# Patient Record
Sex: MALE | Race: WHITE | RURAL
[De-identification: names, ages, dates, MRNs, and addresses within clinical notes are randomized per-mention and may not be internally consistent; named-entity substitution may affect disease eponyms.]

---

## 2017-02-27 ENCOUNTER — HOSPITAL ENCOUNTER (OUTPATIENT)
Dept: OTHER | Age: 47
Discharge: HOME | End: 2017-02-27
Attending: NURSE PRACTITIONER | Admitting: NURSE PRACTITIONER

## 2017-02-28 ENCOUNTER — HOSPITAL ENCOUNTER (OUTPATIENT)
Dept: OTHER | Age: 47
Discharge: HOME | End: 2017-02-28
Attending: NURSE PRACTITIONER | Admitting: NURSE PRACTITIONER

## 2017-02-28 DIAGNOSIS — M25.9 DISORDER OF JOINT: ICD-10-CM

## 2017-02-28 DIAGNOSIS — M25.512 LEFT SHOULDER PAIN, UNSPECIFIED CHRONICITY: ICD-10-CM

## 2017-02-28 DIAGNOSIS — M25.511 RIGHT SHOULDER PAIN, UNSPECIFIED CHRONICITY: ICD-10-CM

## 2017-02-28 DIAGNOSIS — M54.42 ACUTE BACK PAIN WITH SCIATICA, LEFT: ICD-10-CM

## 2017-02-28 LAB
A/G RATIO: 2.3 (ref 0.8–2)
ALBUMIN SERPL-MCNC: 4.8 G/DL (ref 3.4–4.8)
ALP BLD-CCNC: 78 U/L (ref 25–100)
ALT SERPL-CCNC: 28 U/L (ref 4–36)
ANION GAP SERPL CALCULATED.3IONS-SCNC: 14 MMOL/L (ref 3–16)
AST SERPL-CCNC: 27 U/L (ref 8–33)
BASOPHILS ABSOLUTE: 0 K/UL (ref 0–0.1)
BASOPHILS RELATIVE PERCENT: 0.3 %
BILIRUB SERPL-MCNC: 0.7 MG/DL (ref 0.3–1.2)
BUN BLDV-MCNC: 9 MG/DL (ref 6–20)
CALCIUM SERPL-MCNC: 9.4 MG/DL (ref 8.5–10.5)
CHLORIDE BLD-SCNC: 103 MMOL/L (ref 98–107)
CHOLESTEROL, TOTAL: 192 MG/DL (ref 0–200)
CO2: 23 MMOL/L (ref 20–30)
CREAT SERPL-MCNC: 1 MG/DL (ref 0.4–1.2)
EOSINOPHILS ABSOLUTE: 0.1 K/UL (ref 0–0.4)
EOSINOPHILS RELATIVE PERCENT: 1 %
FOLATE: 10.54 NG/ML
GFR AFRICAN AMERICAN: >59
GFR NON-AFRICAN AMERICAN: >59
GLOBULIN: 2.1 G/DL
GLUCOSE BLD-MCNC: 98 MG/DL (ref 74–106)
HCT VFR BLD CALC: 46.7 % (ref 40–54)
HDLC SERPL-MCNC: 56 MG/DL (ref 40–60)
HEMOGLOBIN: 16 G/DL (ref 13–18)
LDL CHOLESTEROL CALCULATED: 117 MG/DL
LYMPHOCYTES ABSOLUTE: 1.8 K/UL (ref 1.5–4)
LYMPHOCYTES RELATIVE PERCENT: 19.6 % (ref 20–40)
MCH RBC QN AUTO: 29.4 PG (ref 27–32)
MCHC RBC AUTO-ENTMCNC: 34.3 G/DL (ref 31–35)
MCV RBC AUTO: 85.7 FL (ref 80–100)
MONOCYTES ABSOLUTE: 0.5 K/UL (ref 0.2–0.8)
MONOCYTES RELATIVE PERCENT: 5.3 % (ref 3–10)
NEUTROPHILS ABSOLUTE: 6.8 K/UL (ref 2–7.5)
NEUTROPHILS RELATIVE PERCENT: 73.8 %
PDW BLD-RTO: 14.1 % (ref 11–16)
PLATELET # BLD: 306 K/UL (ref 150–400)
PMV BLD AUTO: 10.3 FL (ref 6–10)
POTASSIUM SERPL-SCNC: 4.3 MMOL/L (ref 3.4–5.1)
RBC # BLD: 5.45 M/UL (ref 4.5–6)
RHEUMATOID FACTOR: <10 IU/ML
SEDIMENTATION RATE, ERYTHROCYTE: 1 MM/HR (ref 0–10)
SODIUM BLD-SCNC: 140 MMOL/L (ref 136–145)
TOTAL PROTEIN: 6.9 G/DL (ref 6.4–8.3)
TRIGL SERPL-MCNC: 95 MG/DL (ref 0–249)
URIC ACID, SERUM: 6.2 MG/DL (ref 3.7–8.6)
VITAMIN B-12: 368 PG/ML (ref 211–911)
VLDLC SERPL CALC-MCNC: 19 MG/DL
WBC # BLD: 9.1 K/UL (ref 4–11)

## 2017-03-01 LAB
ANA INTERPRETATION: NORMAL
ANTI-NUCLEAR ANTIBODY (ANA): NEGATIVE

## 2017-03-07 ENCOUNTER — TELEPHONE (OUTPATIENT)
Dept: OTHER | Age: 47
End: 2017-03-07

## 2017-05-12 ENCOUNTER — HOSPITAL ENCOUNTER (OUTPATIENT)
Dept: MRI IMAGING | Age: 47
Discharge: HOME | End: 2017-05-12
Attending: NURSE PRACTITIONER | Admitting: NURSE PRACTITIONER

## 2017-05-12 DIAGNOSIS — M54.2 CERVICALGIA: ICD-10-CM

## 2017-05-12 DIAGNOSIS — M54.5 LOW BACK PAIN, UNSPECIFIED BACK PAIN LATERALITY, UNSPECIFIED CHRONICITY, WITH SCIATICA PRESENCE UNSPECIFIED: ICD-10-CM

## 2017-05-12 DIAGNOSIS — M54.40 LUMBAGO WITH SCIATICA: ICD-10-CM

## 2023-08-11 ENCOUNTER — APPOINTMENT (OUTPATIENT)
Dept: ULTRASOUND IMAGING | Facility: HOSPITAL | Age: 53
End: 2023-08-11
Payer: MEDICAID

## 2023-08-11 ENCOUNTER — APPOINTMENT (OUTPATIENT)
Dept: CT IMAGING | Facility: HOSPITAL | Age: 53
End: 2023-08-11
Payer: MEDICAID

## 2023-08-11 ENCOUNTER — HOSPITAL ENCOUNTER (EMERGENCY)
Facility: HOSPITAL | Age: 53
Discharge: HOME | End: 2023-08-11
Attending: STUDENT IN AN ORGANIZED HEALTH CARE EDUCATION/TRAINING PROGRAM
Payer: MEDICAID

## 2023-08-11 ENCOUNTER — APPOINTMENT (OUTPATIENT)
Dept: GENERAL RADIOLOGY | Facility: HOSPITAL | Age: 53
End: 2023-08-11
Payer: MEDICAID

## 2023-08-11 VITALS
OXYGEN SATURATION: 99 % | WEIGHT: 165 LBS | TEMPERATURE: 98.5 F | HEIGHT: 66 IN | RESPIRATION RATE: 16 BRPM | SYSTOLIC BLOOD PRESSURE: 136 MMHG | HEART RATE: 85 BPM | DIASTOLIC BLOOD PRESSURE: 92 MMHG | BODY MASS INDEX: 26.52 KG/M2

## 2023-08-11 DIAGNOSIS — M79.671 FOOT PAIN, RIGHT: Primary | ICD-10-CM

## 2023-08-11 LAB
ALBUMIN SERPL-MCNC: 3.1 G/DL (ref 3.5–5.2)
ALBUMIN/GLOB SERPL: 0.9 G/DL
ALP SERPL-CCNC: 415 U/L (ref 39–117)
ALT SERPL W P-5'-P-CCNC: 78 U/L (ref 1–41)
ANION GAP SERPL CALCULATED.3IONS-SCNC: 12.1 MMOL/L (ref 5–15)
AST SERPL-CCNC: 43 U/L (ref 1–40)
BASOPHILS # BLD AUTO: 0.05 10*3/MM3 (ref 0–0.2)
BASOPHILS NFR BLD AUTO: 0.4 % (ref 0–1.5)
BILIRUB SERPL-MCNC: 1.1 MG/DL (ref 0–1.2)
BUN SERPL-MCNC: 19 MG/DL (ref 6–20)
BUN/CREAT SERPL: 28.4 (ref 7–25)
CALCIUM SPEC-SCNC: 8.9 MG/DL (ref 8.6–10.5)
CHLORIDE SERPL-SCNC: 94 MMOL/L (ref 98–107)
CO2 SERPL-SCNC: 23.9 MMOL/L (ref 22–29)
CREAT SERPL-MCNC: 0.67 MG/DL (ref 0.76–1.27)
D DIMER PPP FEU-MCNC: 5.81 MCGFEU/ML (ref 0–0.51)
DEPRECATED RDW RBC AUTO: 38.2 FL (ref 37–54)
EGFRCR SERPLBLD CKD-EPI 2021: 113 ML/MIN/1.73
EOSINOPHIL # BLD AUTO: 0.04 10*3/MM3 (ref 0–0.4)
EOSINOPHIL NFR BLD AUTO: 0.3 % (ref 0.3–6.2)
ERYTHROCYTE [DISTWIDTH] IN BLOOD BY AUTOMATED COUNT: 13.3 % (ref 12.3–15.4)
GLOBULIN UR ELPH-MCNC: 3.6 GM/DL
GLUCOSE SERPL-MCNC: 157 MG/DL (ref 65–99)
HCT VFR BLD AUTO: 39.5 % (ref 37.5–51)
HGB BLD-MCNC: 13.5 G/DL (ref 13–17.7)
HOLD SPECIMEN: NORMAL
HOLD SPECIMEN: NORMAL
IMM GRANULOCYTES # BLD AUTO: 0.07 10*3/MM3 (ref 0–0.05)
IMM GRANULOCYTES NFR BLD AUTO: 0.6 % (ref 0–0.5)
LYMPHOCYTES # BLD AUTO: 1.8 10*3/MM3 (ref 0.7–3.1)
LYMPHOCYTES NFR BLD AUTO: 15.7 % (ref 19.6–45.3)
MCH RBC QN AUTO: 27.1 PG (ref 26.6–33)
MCHC RBC AUTO-ENTMCNC: 34.2 G/DL (ref 31.5–35.7)
MCV RBC AUTO: 79.3 FL (ref 79–97)
MONOCYTES # BLD AUTO: 0.99 10*3/MM3 (ref 0.1–0.9)
MONOCYTES NFR BLD AUTO: 8.6 % (ref 5–12)
NEUTROPHILS NFR BLD AUTO: 74.4 % (ref 42.7–76)
NEUTROPHILS NFR BLD AUTO: 8.51 10*3/MM3 (ref 1.7–7)
NRBC BLD AUTO-RTO: 0 /100 WBC (ref 0–0.2)
PLATELET # BLD AUTO: 579 10*3/MM3 (ref 140–450)
PMV BLD AUTO: 8.1 FL (ref 6–12)
POTASSIUM SERPL-SCNC: 4.4 MMOL/L (ref 3.5–5.2)
PROT SERPL-MCNC: 6.7 G/DL (ref 6–8.5)
RBC # BLD AUTO: 4.98 10*6/MM3 (ref 4.14–5.8)
SODIUM SERPL-SCNC: 130 MMOL/L (ref 136–145)
TROPONIN T SERPL HS-MCNC: <6 NG/L
WBC NRBC COR # BLD: 11.46 10*3/MM3 (ref 3.4–10.8)
WHOLE BLOOD HOLD COAG: NORMAL
WHOLE BLOOD HOLD SPECIMEN: NORMAL

## 2023-08-11 PROCEDURE — 84484 ASSAY OF TROPONIN QUANT: CPT | Performed by: STUDENT IN AN ORGANIZED HEALTH CARE EDUCATION/TRAINING PROGRAM

## 2023-08-11 PROCEDURE — 99285 EMERGENCY DEPT VISIT HI MDM: CPT

## 2023-08-11 PROCEDURE — 71275 CT ANGIOGRAPHY CHEST: CPT

## 2023-08-11 PROCEDURE — 36415 COLL VENOUS BLD VENIPUNCTURE: CPT

## 2023-08-11 PROCEDURE — 85379 FIBRIN DEGRADATION QUANT: CPT | Performed by: STUDENT IN AN ORGANIZED HEALTH CARE EDUCATION/TRAINING PROGRAM

## 2023-08-11 PROCEDURE — 93971 EXTREMITY STUDY: CPT

## 2023-08-11 PROCEDURE — 73630 X-RAY EXAM OF FOOT: CPT

## 2023-08-11 PROCEDURE — 73030 X-RAY EXAM OF SHOULDER: CPT

## 2023-08-11 PROCEDURE — 25510000001 IOPAMIDOL 61 % SOLUTION: Performed by: STUDENT IN AN ORGANIZED HEALTH CARE EDUCATION/TRAINING PROGRAM

## 2023-08-11 PROCEDURE — 85025 COMPLETE CBC W/AUTO DIFF WBC: CPT

## 2023-08-11 PROCEDURE — 80053 COMPREHEN METABOLIC PANEL: CPT

## 2023-08-11 RX ORDER — ACETAMINOPHEN 500 MG
1000 TABLET ORAL ONCE
Status: COMPLETED | OUTPATIENT
Start: 2023-08-11 | End: 2023-08-11

## 2023-08-11 RX ORDER — NAPROXEN 500 MG/1
500 TABLET ORAL 2 TIMES DAILY PRN
Qty: 14 TABLET | Refills: 0 | Status: SHIPPED | OUTPATIENT
Start: 2023-08-11

## 2023-08-11 RX ORDER — SODIUM CHLORIDE 0.9 % (FLUSH) 0.9 %
10 SYRINGE (ML) INJECTION AS NEEDED
Status: DISCONTINUED | OUTPATIENT
Start: 2023-08-11 | End: 2023-08-11 | Stop reason: HOSPADM

## 2023-08-11 RX ADMIN — IOPAMIDOL 100 ML: 612 INJECTION, SOLUTION INTRAVENOUS at 17:32

## 2023-08-11 RX ADMIN — ACETAMINOPHEN 1000 MG: 500 TABLET, FILM COATED ORAL at 16:26

## 2023-08-11 NOTE — Clinical Note
[facility] EMERGENCY DEPARTMENT  [address] [address]  Phone: [phone]    Rai Corral was seen and treated in our emergency department on 8/11/2023.  He may return to work on 08/13/2023.         Thank you for choosing [facility].    Danny Summers MD

## 2023-08-11 NOTE — ED PROVIDER NOTES
Subjective:  History of Present Illness:    Patient is a 53-year-old male with past medical history of DVT in his right lower extremity, no significant medical history per his report who presents today with right leg swelling, left shoulder pain.  Reports that has been working on a car and acute onset of both pains over the last 24 hours.  States that he had increasing swelling to his right foot.  Denies taking any anticoagulation.  States increasing pain to the area.  No swelling to the left foot.  Denies any preceding fevers.  No chest pain shortness of breath.  Denies any abdominal pain, nausea, vomiting, diarrhea.  No trauma to any area.      Nurses Notes reviewed and agree, including vitals, allergies, social history and prior medical history.     REVIEW OF SYSTEMS: All systems reviewed and not pertinent unless noted.  Review of Systems   Constitutional:  Positive for activity change. Negative for appetite change, chills, fatigue and fever.   HENT:  Negative for congestion, sinus pressure, sneezing and trouble swallowing.    Eyes:  Negative for discharge and itching.   Respiratory:  Negative for cough and shortness of breath.    Cardiovascular:  Negative for chest pain and palpitations.   Gastrointestinal:  Negative for abdominal distention and abdominal pain.   Endocrine: Negative for cold intolerance and heat intolerance.   Genitourinary:  Negative for decreased urine volume, dysuria and urgency.   Musculoskeletal:  Positive for arthralgias, gait problem and joint swelling. Negative for neck pain and neck stiffness.   Skin:  Negative for color change and rash.   Allergic/Immunologic: Negative for immunocompromised state.   Neurological:  Negative for facial asymmetry and headaches.   Hematological:  Negative for adenopathy.   Psychiatric/Behavioral:  Negative for self-injury and suicidal ideas.      History reviewed. No pertinent past medical history.    Allergies:    Patient has no known  "allergies.      History reviewed. No pertinent surgical history.      Social History     Socioeconomic History    Marital status:          History reviewed. No pertinent family history.    Objective  Physical Exam:  /92 (BP Location: Left arm)   Pulse 85   Temp 98.5 øF (36.9 øC) (Oral)   Resp 16   Ht 167.6 cm (66\")   Wt 74.8 kg (165 lb)   SpO2 99%   BMI 26.63 kg/mý      Physical Exam  Constitutional:       General: He is not in acute distress.     Appearance: Normal appearance. He is normal weight. He is not ill-appearing.   HENT:      Head: Normocephalic and atraumatic.      Nose: Nose normal. No congestion or rhinorrhea.      Mouth/Throat:      Mouth: Mucous membranes are moist.      Pharynx: Oropharynx is clear.   Eyes:      Extraocular Movements: Extraocular movements intact.      Conjunctiva/sclera: Conjunctivae normal.      Pupils: Pupils are equal, round, and reactive to light.   Cardiovascular:      Rate and Rhythm: Normal rate and regular rhythm.      Pulses: Normal pulses.   Pulmonary:      Effort: Pulmonary effort is normal. No respiratory distress.      Breath sounds: Normal breath sounds.   Abdominal:      General: Abdomen is flat. Bowel sounds are normal. There is no distension.      Palpations: Abdomen is soft.      Tenderness: There is no abdominal tenderness. There is no guarding or rebound.   Musculoskeletal:         General: Swelling and tenderness present. Normal range of motion.      Cervical back: Normal range of motion and neck supple. No rigidity or tenderness.      Right lower leg: Edema present.      Left lower leg: No edema.      Comments: Swelling and tenderness of the right leg   Skin:     General: Skin is warm and dry.      Capillary Refill: Capillary refill takes less than 2 seconds.   Neurological:      General: No focal deficit present.      Mental Status: He is alert and oriented to person, place, and time. Mental status is at baseline.      Cranial Nerves: No " cranial nerve deficit.      Sensory: No sensory deficit.      Motor: No weakness.      Coordination: Coordination normal.      Gait: Gait normal.   Psychiatric:         Mood and Affect: Mood normal.         Behavior: Behavior normal.         Thought Content: Thought content normal.         Judgment: Judgment normal.       Procedures    ED Course:         Lab Results (last 24 hours)       Procedure Component Value Units Date/Time    CBC Auto Differential [[id number]]  (Abnormal) Collected: 08/11/23 1503    Specimen: Blood Updated: 08/11/23 1511     WBC 11.46 10*3/mm3      RBC 4.98 10*6/mm3      Hemoglobin 13.5 g/dL      Hematocrit 39.5 %      MCV 79.3 fL      MCH 27.1 pg      MCHC 34.2 g/dL      RDW 13.3 %      RDW-SD 38.2 fl      MPV 8.1 fL      Platelets 579 10*3/mm3      Neutrophil % 74.4 %      Lymphocyte % 15.7 %      Monocyte % 8.6 %      Eosinophil % 0.3 %      Basophil % 0.4 %      Immature Grans % 0.6 %      Neutrophils, Absolute 8.51 10*3/mm3      Lymphocytes, Absolute 1.80 10*3/mm3      Monocytes, Absolute 0.99 10*3/mm3      Eosinophils, Absolute 0.04 10*3/mm3      Basophils, Absolute 0.05 10*3/mm3      Immature Grans, Absolute 0.07 10*3/mm3      nRBC 0.0 /100 WBC     Comprehensive Metabolic Panel [[id number]]  (Abnormal) Collected: 08/11/23 1503    Specimen: Blood Updated: 08/11/23 1535     Glucose 157 mg/dL      BUN 19 mg/dL      Creatinine 0.67 mg/dL      Sodium 130 mmol/L      Potassium 4.4 mmol/L      Chloride 94 mmol/L      CO2 23.9 mmol/L      Calcium 8.9 mg/dL      Total Protein 6.7 g/dL      Albumin 3.1 g/dL      ALT (SGPT) 78 U/L      AST (SGOT) 43 U/L      Alkaline Phosphatase 415 U/L      Total Bilirubin 1.1 mg/dL      Globulin 3.6 gm/dL      A/G Ratio 0.9 g/dL      BUN/Creatinine Ratio 28.4     Anion Gap 12.1 mmol/L      eGFR 113.0 mL/min/1.73     Narrative:      GFR Normal >60  Chronic Kidney Disease <60  Kidney Failure <15      Single High Sensitivity Troponin T [[id number]]  (Normal)  "Collected: 08/11/23 1503    Specimen: Blood Updated: 08/11/23 1638     HS Troponin T <6 ng/L     Narrative:      High Sensitive Troponin T Reference Range:  <10.0 ng/L- Negative Female for AMI  <15.0 ng/L- Negative Male for AMI  >=10 - Abnormal Female indicating possible myocardial injury.  >=15 - Abnormal Male indicating possible myocardial injury.   Clinicians would have to utilize clinical acumen, EKG, Troponin, and serial changes to determine if it is an Acute Myocardial Infarction or myocardial injury due to an underlying chronic condition.         D-dimer, Quantitative [[id number]]  (Abnormal) Collected: 08/11/23 1503    Specimen: Blood Updated: 08/11/23 1659     D-Dimer, Quantitative 5.81 MCGFEU/mL     Narrative:      According to the assay 's published package insert, a normal (<0.50 MCGFEU/mL) D-dimer result in conjunction with a non-high clinical probability assessment, excludes deep vein thrombosis (DVT) and pulmonary embolism (PE) with high sensitivity.    D-dimer values increase with age and this can make VTE exclusion of an older population difficult. To address this, the [employer] College [employer], based on best available evidence and recent guidelines, recommends that clinicians use age-adjusted D-dimer thresholds in patients greater than 50 years of age with: a) a low probability of PE who do not meet all Pulmonary Embolism Rule Out Criteria, or b) in those with intermediate probability of PE.   The formula for an age-adjusted D-dimer cut-off is \"age/100\".  For example, a 60 year old patient would have an age-adjusted cut-off of 0.60 MCGFEU/mL and an 80 year old 0.80 MCGFEU/mL.             CT Angiogram Chest Pulmonary Embolism    Result Date: 8/11/2023  FINAL REPORT TECHNIQUE: Thin section axial images were obtained through the chest and during the arterial phase of IV contrast administration. Coronal 3-D MIP reconstructed images were also provided. CLINICAL HISTORY: Pulmonary " embolism (PE) suspected, positive D-dimer  Rt arm and leg swelling FINDINGS: The pulmonary arteries are well-opacified and there is no filling defect to indicate pulmonary embolism. The thoracic aorta is normal. The lungs are clear. There is no pleural disease, adenopathy or significant osseous abnormality.     Impression: No pulmonary embolism. Authenticated and Electronically Signed by Marty Smith M.D. on 08/11/2023 06:38:54 PM    US Venous Doppler Lower Extremity Right (duplex)    Result Date: 8/11/2023  FINAL REPORT TECHNIQUE: Multiple transverse and longitudinal images were performed of the femoral-popliteal deep venous system with augmentation and compression maneuvers. CLINICAL HISTORY: leg swelling, pain, c/f DVT FINDINGS: Right lower extremity duplex ultrasound demonstrates normal flow in the deep venous system.  There is no abnormal echogenicity to suggest thrombus.  There is normal compression and augmentation.     Impression: No evidence of DVT. Authenticated and Electronically Signed by Marty Smith M.D. on 08/11/2023 06:37:09 PM        MDM     Amount and/or Complexity of Data Reviewed  Independent visualization of images, tracings, or specimens: yes        Initial impression of presenting illness: Right leg swelling, pain, left shoulder pain    DDX: includes but is not limited to: DVT, PE, rotator cuff injury, malingering    Patient arrives stable with vitals interpreted by myself.     Pertinent features from physical exam: Patient with right leg swelling on exam, neurovascular intact, no obvious deformity to the patient's left shoulder, hysterical asking for pain medicine on my immediate assessment.    Initial diagnostic plan: CBC, CMP, troponin, D-dimer, CRP, Pro-Zaki, EKG, DVT ultrasound, shoulder x-ray    Results from initial plan were reviewed and interpreted by me revealing no concern for DVT, no obvious fracture on x-ray of the foot or the shoulder, CT PE negative    Diagnostic information from other  sources: Reviewed past medical records    Interventions / Re-evaluation: Given Tylenol for pain control in our emergency department    Results/clinical rationale were discussed with patient at bedside    Consultations/Discussion of results with other physicians: Yary negative work-up in our emergency department however, with increased foot pain, recommended follow-up with orthopedic surgery for further evaluation and management.  Gave short course of naproxen for pain control and strict turn precaution for any severe increase in pain, fevers    Disposition plan: Discharge  -----        Final diagnoses:   Foot pain, right          Edge, Danny Ivory MD  08/11/23 1953

## 2023-08-11 NOTE — DISCHARGE INSTRUCTIONS
You were evaluated for foot pain.  We got an ultrasound of your leg and a plain film your foot which showed no concern for blood clot or any fracture to your foot.  We got an x-ray of your shoulder which was negative for any acute fracture.  Because of your pain and leg swelling we were concerned for the possibility of blood clot and got a CT scan which was also negative for any blood clot in your chest.  Your labs are unremarkable and you are now stable for discharge.  For your foot pain, we have prescribed naproxen over the next several days for pain control.  Would also recommend that you follow-up with our orthopedic group for further evaluation of your foot pain.  If you have severe exacerbation of this pain or fevers, please come back to emergency department for further evaluation.  You are now stable for discharge.

## 2023-08-16 ENCOUNTER — NURSE TRIAGE (OUTPATIENT)
Dept: OTHER | Facility: CLINIC | Age: 53
End: 2023-08-16

## 2023-08-16 NOTE — TELEPHONE ENCOUNTER
Location of patient: [address] call from Denzel Madden at [facility] [employer]; Patient with Red Flag Complaint requesting to establish care with [facility]. Subjective: Caller states \"Got hurt at work. Fell and both legs are starting to go numb. 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 He thought he was ok, but as went on ankle started to swell and got worse. Seen in ER on 11th, and everything came back ok. He is barely ok. He is kind of immobilized up her. Ankle is keeping swelling even when icing. Red around it. I wonder if he was bit by spider or tick. Walked to bedroom and back yesterday. Took an hour and 10 mins and they are 10 ft apart. He drags his feet. Almost like he is paralyzed. Pretty much been in bed most of the month. I wonder if we can get home health before he can get in to see MD. Left shoulder. Also has problem with big toe on left. Doesn't want to move or be touched. \"     Current Symptoms: left shoulder swollen and painful, right ankle is swollen/redness around/warm, numbness right wrist/hand and both hands swollen. Onset: 2 months ago; worsening    Associated Symptoms: reduced activity    Pain Severity: 10+/10; like someone is trying to rip muscles out of leg; constant    Temperature: denies fever by unknown method    What has been tried: Naproxen, Tylenol    LMP: NA Pregnant: NA    Recommended disposition: Go to ED/UCC Now (Or to Office with PCP Approval)    Care advice provided, patient verbalizes understanding; denies any other questions or concerns; instructed to call back for any new or worsening symptoms. Patient/Caller agrees with recommended disposition; writer provided warm transfer to [facility] at [facility] [employer] for appointment scheduling    Attention Provider: Thank you for allowing me to participate in the care of your patient.   The patient was connected to triage in response to information provided to

## 2023-11-08 ENCOUNTER — OFFICE VISIT (OUTPATIENT)
Dept: PRIMARY CARE CLINIC | Age: 53
End: 2023-11-08

## 2023-11-08 VITALS
RESPIRATION RATE: 18 BRPM | HEART RATE: 77 BPM | WEIGHT: 160.6 LBS | DIASTOLIC BLOOD PRESSURE: 74 MMHG | HEIGHT: 66 IN | OXYGEN SATURATION: 100 % | SYSTOLIC BLOOD PRESSURE: 126 MMHG | BODY MASS INDEX: 25.81 KG/M2

## 2023-11-08 DIAGNOSIS — M25.531 RIGHT WRIST PAIN: ICD-10-CM

## 2023-11-08 DIAGNOSIS — F41.9 ANXIETY: ICD-10-CM

## 2023-11-08 DIAGNOSIS — Z11.4 SCREENING FOR HIV WITHOUT PRESENCE OF RISK FACTORS: ICD-10-CM

## 2023-11-08 DIAGNOSIS — G89.29 CHRONIC PAIN OF RIGHT ANKLE: ICD-10-CM

## 2023-11-08 DIAGNOSIS — M25.561 CHRONIC PAIN OF RIGHT KNEE: ICD-10-CM

## 2023-11-08 DIAGNOSIS — G89.29 CHRONIC PAIN OF RIGHT KNEE: ICD-10-CM

## 2023-11-08 DIAGNOSIS — M25.512 CHRONIC LEFT SHOULDER PAIN: Primary | ICD-10-CM

## 2023-11-08 DIAGNOSIS — G89.29 CHRONIC LEFT SHOULDER PAIN: Primary | ICD-10-CM

## 2023-11-08 DIAGNOSIS — Z12.5 SCREENING FOR PROSTATE CANCER: ICD-10-CM

## 2023-11-08 DIAGNOSIS — R74.01 TRANSAMINITIS: ICD-10-CM

## 2023-11-08 DIAGNOSIS — Z87.891 PERSONAL HISTORY OF NICOTINE DEPENDENCE: ICD-10-CM

## 2023-11-08 DIAGNOSIS — R73.9 HYPERGLYCEMIA: ICD-10-CM

## 2023-11-08 DIAGNOSIS — M25.571 CHRONIC PAIN OF RIGHT ANKLE: ICD-10-CM

## 2023-11-08 DIAGNOSIS — Z90.49 H/O PARTIAL RESECTION OF COLON: ICD-10-CM

## 2023-11-08 RX ORDER — DULOXETIN HYDROCHLORIDE 30 MG/1
30 CAPSULE, DELAYED RELEASE ORAL DAILY
Qty: 30 CAPSULE | Refills: 3 | Status: SHIPPED | OUTPATIENT
Start: 2023-11-08

## 2023-11-08 SDOH — ECONOMIC STABILITY: FOOD INSECURITY: WITHIN THE PAST 12 MONTHS, THE FOOD YOU BOUGHT JUST DIDN'T LAST AND YOU DIDN'T HAVE MONEY TO GET MORE.: NEVER TRUE

## 2023-11-08 SDOH — ECONOMIC STABILITY: INCOME INSECURITY: HOW HARD IS IT FOR YOU TO PAY FOR THE VERY BASICS LIKE FOOD, HOUSING, MEDICAL CARE, AND HEATING?: NOT VERY HARD

## 2023-11-08 SDOH — ECONOMIC STABILITY: HOUSING INSECURITY
IN THE LAST 12 MONTHS, WAS THERE A TIME WHEN YOU DID NOT HAVE A STEADY PLACE TO SLEEP OR SLEPT IN A SHELTER (INCLUDING NOW)?: NO

## 2023-11-08 SDOH — ECONOMIC STABILITY: FOOD INSECURITY: WITHIN THE PAST 12 MONTHS, YOU WORRIED THAT YOUR FOOD WOULD RUN OUT BEFORE YOU GOT MONEY TO BUY MORE.: NEVER TRUE

## 2023-11-08 ASSESSMENT — PATIENT HEALTH QUESTIONNAIRE - PHQ9
SUM OF ALL RESPONSES TO PHQ QUESTIONS 1-9: 0
SUM OF ALL RESPONSES TO PHQ QUESTIONS 1-9: 0
1. LITTLE INTEREST OR PLEASURE IN DOING THINGS: 0
SUM OF ALL RESPONSES TO PHQ QUESTIONS 1-9: 0
2. FEELING DOWN, DEPRESSED OR HOPELESS: 0
SUM OF ALL RESPONSES TO PHQ QUESTIONS 1-9: 0
SUM OF ALL RESPONSES TO PHQ9 QUESTIONS 1 & 2: 0

## 2023-11-08 ASSESSMENT — ENCOUNTER SYMPTOMS
BACK PAIN: 0
SHORTNESS OF BREATH: 0
EYE DISCHARGE: 0
SINUS PRESSURE: 0
NAUSEA: 0
WHEEZING: 0
ABDOMINAL PAIN: 0
COUGH: 0
VOMITING: 0

## 2023-11-08 NOTE — PROGRESS NOTES
Chief Complaint   Patient presents with    New Patient       Have you seen any other physician or provider since your last visit yes - Jaxon Alcaraz previous pcp    Have you had any other diagnostic tests since your last visit? no    Have you changed or stopped any medications since your last visit? no
Vitamin D 25 Hydroxy; Future  - Vitamin B12; Future  - Uric Acid; Future  - TSH; Future    Going to start patient on Cymbalta given his somatic complaint and also seems to be slightly anxious during the visit. Orders Placed This Encounter   Medications    DULoxetine (CYMBALTA) 30 MG extended release capsule     Sig: Take 1 capsule by mouth daily     Dispense:  30 capsule     Refill:  3        I, Ping Borges LPN am scribing for and in the presence of Cal Lomeli MD on this date 11/08/23 at 9:21 AM.    I, Dr. Cal Lomeli, personally performed the services described in the documentation as scribed by Ping Borges LPN, in my presence and it is both accurate and complete.

## 2024-05-10 ENCOUNTER — COMMUNITY OUTREACH (OUTPATIENT)
Dept: PRIMARY CARE CLINIC | Age: 54
End: 2024-05-10

## 2024-05-10 NOTE — PROGRESS NOTES
Patient's HM shows they are overdue for Colonoscopy   CareEverywhere and  files searched without success.      [place]